# Patient Record
(demographics unavailable — no encounter records)

---

## 2018-02-06 NOTE — RADIOLOGY REPORT (SQ)
EXAM DESCRIPTION:  CT HEAD WITHOUT



COMPLETED DATE/TIME:  2/6/2018 11:32 am



REASON FOR STUDY:  tv fell on face and head last, dilated pupils



COMPARISON:  None.



TECHNIQUE:  Axial images acquired through the brain without intravenous contrast.  Images reviewed wi
th bone, brain and subdural windows.  Images stored on PACS.

All CT scanners at this facility use dose modulation, iterative reconstruction, and/or weight based d
osing when appropriate to reduce radiation dose to as low as reasonably achievable (ALARA).

[facility]: Dose Right  [facility]: CareDose    [facility]: Dose Right    [facility]: Teradose 4D    [facility]: Smart Technologies



RADIATION DOSE:  CT Rad equipment meets quality standard of care and radiation dose reduction techniq
ues were employed. CTDIvol: 33.8 mGy. DLP: 541 mGy-cm. mGy.



LIMITATIONS:  None.



FINDINGS:  VENTRICLES: Normal size and contour.

CEREBRUM: No masses.  No hemorrhage.  No midline shift.  No evidence for acute infarction. Normal gra
y/white matter differentiation. No areas of low density in the white matter.

CEREBELLUM: No masses.  No hemorrhage.  No alteration of density.  No evidence for acute infarction.

EXTRAAXIAL SPACES: No fluid collections.  No masses.

ORBITS AND GLOBE: No intra- or extraconal masses.  Normal contour of globe without masses.

CALVARIUM: No fracture.

PARANASAL SINUSES: No fluid or mucosal thickening.

SOFT TISSUES: No mass or hematoma.

OTHER: No other significant finding.



IMPRESSION:  NORMAL BRAIN CT WITHOUT CONTRAST.

EVIDENCE OF ACUTE STROKE: NO.



COMMENT:  Quality ID # [id number]: Final reports with documentation of one or more dose reduction techniques
 (e.g., Automated exposure control, adjustment of the mA and/or kV according to patient size, use of 
iterative reconstruction technique)



TECHNICAL DOCUMENTATION:  JOB ID:  [id number]

 [employer]- All Rights Reserved

## 2018-02-06 NOTE — ER DOCUMENT REPORT
ED General





- General


Chief Complaint: Head Injury


Stated Complaint: ABDOMINAL PAIN


Time Seen by Provider: 02/06/18 11:08


TRAVEL OUTSIDE OF THE [place] IN LAST 30 DAYS: No





- HPI


Patient complains to provider of: Head injury


Notes: 





According to the family patient was watching TV last night patient was trying 

to stand TV stand when the TV fell on the patient landing on her head since 

that time patient developed bruising of the face complaining of headaches came 

today for further evaluation.  Upon my evaluation patient has an obvious left 

black eye patient is smiling and giggling age-appropriate.  Patient is not 

denies any abdominal pain and is actually requesting to eat some food.  

According to the family member the TV was a old ct tube tv ~32inch





- Related Data


Allergies/Adverse Reactions: 


 





No Known Allergies Allergy (Verified 02/06/18 10:32)


 











Past Medical History





- Social History


Smoking Status: Never Smoker


Chew tobacco use (# tins/day): No


Frequency of alcohol use: None


Drug Abuse: None


Family History: Reviewed & Not Pertinent


Patient has suicidal ideation: No


Patient has homicidal ideation: No





- Past Medical History


Cardiac Medical History: 


   Denies: Hx Heart Attack, Hx Hypertension


Pulmonary Medical History: 


   Denies: Hx Asthma


Neurological Medical History: Denies: Hx Cerebrovascular Accident, Hx Seizures


Renal/ Medical History: Denies: Hx Peritoneal Dialysis


GI Medical History: Denies: Hx Hepatitis, Hx Hiatal Hernia, Hx Ulcer


Infectious Medical History: Denies: Hx Hepatitis


Past Surgical History: Denies: Hx Mastectomy, Hx Open Heart Surgery, Hx 

Pacemaker





- Immunizations


Immunizations up to date: Yes





Review of Systems





- Review of Systems


Constitutional: Other - Head injury


EENT: No symptoms reported


Cardiovascular: No symptoms reported


Respiratory: No symptoms reported


Gastrointestinal: No symptoms reported


Genitourinary: No symptoms reported


Female Genitourinary: No symptoms reported


Musculoskeletal: No symptoms reported


Skin: No symptoms reported


Hematologic/Lymphatic: No symptoms reported


Neurological/Psychological: No symptoms reported





Physical Exam





- Vital signs


Vitals: 





 











Temp Pulse Resp BP Pulse Ox


 


 97.4 F L  113 H  20   108/67   98 


 


 02/06/18 10:38  02/06/18 10:38  02/06/18 10:38  02/06/18 10:38  02/06/18 10:38











Interpretation: Normal





- General


General appearance: Appears well, Alert


General appearance pediatric: Attentiveness normal, Good eye contact





- HEENT


Head: Normocephalic, Other - Bruising and ecchymosis mostly to the left thigh 

to the upper orbit.


Eyes: Normal


Conjunctiva: Normal


Cornea: Normal


Extraocular movements intact: Yes -  


Pupils: PERRL


Anterior chamber: Normal


Fundascopic: Normal


Neck: Normal





- Respiratory


Respiratory status: No respiratory distress


Chest status: Nontender


Breath sounds: Normal


Chest palpation: Normal





- Cardiovascular


Rhythm: Regular


Heart sounds: Normal auscultation


Murmur: No





- Abdominal


Inspection: Normal


Distension: No distension


Bowel sounds: Normal


Tenderness: Nontender.  No: Tender, McBurney's point, Burnett's sign, Guarding, 

Rebound


Organomegaly: No organomegaly





- Back


Back: Normal, Nontender





- Extremities


General upper extremity: Normal inspection, Nontender, Normal color, Normal ROM

, Normal temperature


General lower extremity: Normal inspection, Nontender, Normal color, Normal ROM

, Normal temperature, Normal weight bearing.  No: Long's sign





- Neurological


Neuro grossly intact: Yes


Cognition: Normal


Orientation: AAOx4


Ped Michelle Coma Scale Eye Opening: Spontaneous


Ped [place] Coma Scale Verbal: Age appropriate verbal


Ped [place] Coma Scale Motor: Spontaneous Movements


Pediatric [place] Coma Scale Total: 15


Speech: Normal


Motor strength normal: LUE, RUE, LLE, RLE


Sensory: Normal





- Psychological


Associated symptoms: Normal affect, Normal mood





- Skin


Skin Temperature: Warm


Skin Moisture: Dry


Skin Color: Normal





Course





- Re-evaluation


Re-evalutation: 





02/06/18 15:34


X-rays not show any significant pathology.  Patient was given close head injury 

instructions will discharge home.





- Vital Signs


Vital signs: 





 











Temp Pulse Resp BP Pulse Ox


 


 97.4 F L  113 H  20   108/67   98 


 


 02/06/18 10:38  02/06/18 10:38  02/06/18 11:39  02/06/18 10:38  02/06/18 10:38














Discharge





- Discharge


Clinical Impression: 


Closed head injury


Qualifiers:


 Encounter type: initial encounter Qualified Code(s): S09.90XA - Unspecified 

injury of head, initial encounter





Contusion of left orbit


Qualifiers:


 Encounter type: initial encounter Qualified Code(s): S05.12XA - Contusion of 

eyeball and orbital tissues, left eye, initial encounter





Condition: Good


Disposition: HOME, SELF-CARE


Instructions:  Head Injury, Child ([facility]), Contusion ([facility]), Acetaminophen, 

Pediatric Ibuprofen ([facility])


Additional Instructions: 


Your child weighs 20 kg today are approximately 40 pounds.  Please use the 

dosing charts to correctly dose her child with Tylenol Motrin.  Your CT scan of 

the head today is normal.  The bruising around the child's left eye will take 

approximately 1-1/2 weeks to clear.  Please read her discharge instructions 

carefully return to the ER for any concerning issues.


Prescriptions: 


Acetaminophen 9.5 ml PO Q6 #120 liquid


Ibuprofen [Motrin  100 Mg/5 Ml Oral Susp] 200 mg PO Q8 #100 oral.susp


Referrals: 


DIAZ SMITH MD [Primary Care Provider] - Follow up as needed

## 2018-02-06 NOTE — ER DOCUMENT REPORT
ED Medical Screen (RME)





- General


Chief Complaint: Abdominal Pain


Stated Complaint: ABDOMINAL PAIN


Time Seen by Provider: 02/06/18 11:08


Notes: 





This is a 4-year-old patient who had a TV fall on her head and face yesterday.  

Has had some nausea with some abdominal pain.  No abdominal pain at this time.  

Complaining of pain around the left eye headache and generally not feeling well.





I have greeted and performed a rapid initial assessment of this patient.  A 

comprehensive ED assessment and evaluation of the patient, analysis of test 

results and completion of the medical decision making process will be conducted 

by additional ED providers.


TRAVEL OUTSIDE OF THE [place] IN LAST 30 DAYS: No





- Related Data


Allergies/Adverse Reactions: 


 





No Known Allergies Allergy (Verified 02/06/18 10:32)


 











Past Medical History





- Social History


Chew tobacco use (# tins/day): No


Frequency of alcohol use: None


Drug Abuse: None





- Past Medical History


Cardiac Medical History: 


   Denies: Hx Heart Attack, Hx Hypertension


Pulmonary Medical History: 


   Denies: Hx Asthma


Neurological Medical History: Denies: Hx Cerebrovascular Accident, Hx Seizures


Renal/ Medical History: Denies: Hx Peritoneal Dialysis


GI Medical History: Denies: Hx Hepatitis, Hx Hiatal Hernia, Hx Ulcer


Infectious Medical History: Denies: Hx Hepatitis


Past Surgical History: Denies: Hx Mastectomy, Hx Open Heart Surgery, Hx 

Pacemaker





- Immunizations


Immunizations up to date: Yes





Physical Exam





- Vital signs


Vitals: 





 











Temp Pulse Resp BP Pulse Ox


 


 97.4 F L  113 H  20   108/67   98 


 


 02/06/18 10:38  02/06/18 10:38  02/06/18 10:38  02/06/18 10:38  02/06/18 10:38














Course





- Vital Signs


Vital signs: 





 











Temp Pulse Resp BP Pulse Ox


 


 97.4 F L  113 H  20   108/67   98 


 


 02/06/18 10:38  02/06/18 10:38  02/06/18 10:38  02/06/18 10:38  02/06/18 10:38














Doctor's Discharge





- Discharge


Instructions:  Observation for Appendicitis ([facility])